# Patient Record
Sex: FEMALE | Race: WHITE | NOT HISPANIC OR LATINO | Employment: FULL TIME | URBAN - METROPOLITAN AREA
[De-identification: names, ages, dates, MRNs, and addresses within clinical notes are randomized per-mention and may not be internally consistent; named-entity substitution may affect disease eponyms.]

---

## 2020-03-31 ENCOUNTER — TELEPHONE (OUTPATIENT)
Dept: FAMILY MEDICINE CLINIC | Facility: CLINIC | Age: 25
End: 2020-03-31

## 2020-03-31 ENCOUNTER — TELEMEDICINE (OUTPATIENT)
Dept: FAMILY MEDICINE CLINIC | Facility: CLINIC | Age: 25
End: 2020-03-31
Payer: COMMERCIAL

## 2020-03-31 DIAGNOSIS — Z20.822 SUSPECTED COVID-19 VIRUS INFECTION: Primary | ICD-10-CM

## 2020-03-31 PROCEDURE — 99213 OFFICE O/P EST LOW 20 MIN: CPT | Performed by: FAMILY MEDICINE

## 2020-03-31 NOTE — ASSESSMENT & PLAN NOTE
Patient is asymptomatic   2 employees at [employer] tested [employer] 10 days ago  She doesn't know if there was any direct exposure  She denies all symptoms except for mild cough after exercising yesterday  She feels absolutely fine  Does not require testing at this time  Pt does not have a PCP  Will establish care with our office  See in 3 months for in office visit

## 2020-03-31 NOTE — PROGRESS NOTES
COVID-19 Virtual Visit     This virtual check-in was done via [employer] and patient was informed that this is not a secure, HIPAA-complaint platform  she agrees to proceed     Encounter provider Roselynn Angelucci, MD    Provider located at [address] [place], [place] [address]  [phone]    Recent Visits  No visits were found meeting these conditions  Showing recent visits within past 7 days and meeting all other requirements     Today's Visits  Date Type Provider Dept   03/31/20 Telemedicine Roselynn Angelucci, MD [facility]   03/31/20 Telephone Sosa Yuen, [facility] today's visits and meeting all other requirements     Future Appointments  Date Type Provider Dept   03/31/20 Telephone Sosa Yuen [address]   03/31/20 [address], MD [address] [place] [address]   Showing future appointments within next 150 days and meeting all other requirements        Patient agrees to participate in a virtual check in via telephone or video visit instead of presenting to the office to address urgent/immediate medical needs  Patient is aware this is a billable service  After connecting through [employer], the patient was identified by name and date of birth  Speedy Orosco was informed that this was a telemedicine visit and that the exam was being conducted confidentially over secure lines  My office door was closed  No one else was in the room  Speedy Shieldsbury acknowledged consent and understanding of privacy and security of the telemedicine visit  I informed the patient that I have reviewed her record in Epic and presented the opportunity for her to ask any questions regarding the visit today  The patient agreed to participate  Speedy Orosco is a 23 y o  female who is concerned about COVID-19  [employer] employee possible exposure to 2 covid cases    Doesn't know if there is any direct contact  Cough yesterday after working out which is now gone  Denies Fever, checked 2x a week at work  Denies SOB  Patient states she probably is just being paranoid she feels fine  She reports mild cough after exercise yesterday  She has not traveled outside the [place]  within the last 14 days    She has not had contact with a person who is under investigation for or who is positive for COVID-19 within the last 14 days  She has not been hospitalized recently for fever and/or lower respiratory symptoms  No past medical history on file  No past surgical history on file  No current outpatient medications on file  No current facility-administered medications for this visit  Allergies not on file    Video Exam    Celia Harmon appears well  No signs of distress  AOx3  Disposition:      After clarifying the patient's history, my suspicion for COVID-19 infection is very low  I spent 15 minutes with the patient during this virtual check-in visit

## 2020-03-31 NOTE — TELEPHONE ENCOUNTER
Do you have a fever of 100 5 and above?: denies  Do you have a cough?: yes  Do you have shortness of breath?: denies   Are you currently or have you been suffering from a 100 5 fever and diarrhea? denies    Have you visited [facility]?:denies    Have you visited [facility]    Have you visited [facility], [address]?:denies    Have you been exposed to someone who has been diagnosed with coronavirus?: [employer] employee    Do you commute daily to and from [place] or [place] for work?: denies    Patient is worried and would like a call back from a provider because she works an [employer] in [place] and 2 employees resulted positive but has no idea if she has been in contact with them       Patient can be reached at [phone]

## 2020-03-31 NOTE — TELEPHONE ENCOUNTER
Please confirm her registration info, confirm whether she has a method for video visit, and then Dr Vero Freedman can do a virtual on-the-fly encounter  Thanks!

## 2020-04-07 ENCOUNTER — TELEPHONE (OUTPATIENT)
Dept: FAMILY MEDICINE CLINIC | Facility: CLINIC | Age: 25
End: 2020-04-07

## 2020-07-14 ENCOUNTER — OFFICE VISIT (OUTPATIENT)
Dept: FAMILY MEDICINE CLINIC | Facility: CLINIC | Age: 25
End: 2020-07-14
Payer: COMMERCIAL

## 2020-07-14 VITALS
HEART RATE: 80 BPM | HEIGHT: 63 IN | SYSTOLIC BLOOD PRESSURE: 118 MMHG | BODY MASS INDEX: 27.07 KG/M2 | RESPIRATION RATE: 16 BRPM | DIASTOLIC BLOOD PRESSURE: 70 MMHG | TEMPERATURE: 98.6 F | OXYGEN SATURATION: 97 % | WEIGHT: 152.8 LBS

## 2020-07-14 DIAGNOSIS — Z13.29 SCREENING FOR THYROID DISORDER: ICD-10-CM

## 2020-07-14 DIAGNOSIS — Z11.4 SCREENING FOR HIV (HUMAN IMMUNODEFICIENCY VIRUS): ICD-10-CM

## 2020-07-14 DIAGNOSIS — Z13.1 SCREENING FOR DIABETES MELLITUS: ICD-10-CM

## 2020-07-14 DIAGNOSIS — Z13.220 SCREENING FOR LIPID DISORDERS: ICD-10-CM

## 2020-07-14 DIAGNOSIS — Z00.00 WELLNESS EXAMINATION: Primary | ICD-10-CM

## 2020-07-14 PROCEDURE — NC001 PR NO CHARGE: Performed by: FAMILY MEDICINE

## 2020-07-14 PROCEDURE — 3008F BODY MASS INDEX DOCD: CPT | Performed by: FAMILY MEDICINE

## 2020-07-14 PROCEDURE — 1036F TOBACCO NON-USER: CPT | Performed by: FAMILY MEDICINE

## 2020-07-14 PROCEDURE — 99395 PREV VISIT EST AGE 18-39: CPT | Performed by: FAMILY MEDICINE

## 2020-07-14 RX ORDER — NORETHINDRONE ACETATE AND ETHINYL ESTRADIOL, ETHINYL ESTRADIOL AND FERROUS FUMARATE 1MG-10(24)
KIT ORAL
COMMUNITY
Start: 2020-06-27

## 2020-07-14 NOTE — ASSESSMENT & PLAN NOTE
Patient has no significant past medical history  Patient likely screen for thyroid, diabetes and cholesterol  Patient instructed to download my chart  Will call patient with any abnormal labs  Will see patient back in 1 year  For physical or as needed  Patient takes birth control which is managed by her OBGYN      vaccinations up-to-date  Patient obtains flu shot from work

## 2020-07-14 NOTE — PROGRESS NOTES
Family Medicine Follow-Up Office Visit  Zachary Campbell 23 y o  female   MRN: [id number] : 1995  ENCOUNTER: 2020 1:35 PM    Assessment and Plan   Screening for diabetes mellitus  Family history of diabetes  Patient instructed to fast prior to obtaining labs    Screening for thyroid disorder   See  Wellness note    Screening for HIV (human immunodeficiency virus)   See wellness note    Wellness examination   Patient has no significant past medical history  Patient likely screen for thyroid, diabetes and cholesterol  Patient instructed to download my chart  Will call patient with any abnormal labs  Will see patient back in 1 year  For physical or as needed  Patient takes birth control which is managed by her OBGYN      vaccinations up-to-date  Patient obtains flu shot from work  Chief Complaint     Chief Complaint   Patient presents with    Establish Care       History of Present Illness   Zachary Campbell is a 23y o -year-old female who presents today   To establish care  Patient reports she has not seen a primary care physician since returning 26  She sees an OBGYN outside of the [facility]  Family history positive for heart disease in her father and brother but is unsure on specifics  Patient's mother passed away at 13 and she believes she suffered from depression  Maternal grandmother has diabetes  Patient has no significant medical history personally  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 [employer]  We initially had a telemedicine for covid screening  Review of Systems   Review of Systems   Constitutional: Negative for fatigue and fever  HENT: Negative for congestion and sore throat  Eyes: Negative for redness and visual disturbance  Respiratory: Negative for cough and shortness of breath  Cardiovascular: Negative for chest pain and palpitations  Gastrointestinal: Negative for abdominal pain and nausea     Genitourinary: Negative for difficulty urinating and dysuria  Musculoskeletal: Negative for arthralgias and back pain  Skin: Negative  Negative for color change and rash  Neurological: Negative for dizziness and headaches  Psychiatric/Behavioral: Negative  Active Problem List     Patient Active Problem List   Diagnosis    Suspected Covid-19 Virus Infection    Screening for diabetes mellitus    Screening for thyroid disorder    Screening for HIV (human immunodeficiency virus)    Wellness examination    Screening for lipid disorders       Past Medical History, Past Surgical History, Family History, and Social History were reviewed and updated today as appropriate  Objective   /70 (BP Location: Right arm, Patient Position: Sitting, Cuff Size: Adult)   Pulse 80   Temp 98 6 °F (37 °C) (Tympanic)   Resp 16   Ht 5' 3" (1 6 m)   Wt 69 3 kg (152 lb 12 8 oz)   SpO2 97%   BMI 27 07 kg/m²     Physical Exam   Constitutional: She is oriented to person, place, and time  She appears well-developed and well-nourished  HENT:   Head: Normocephalic and atraumatic  Eyes: Pupils are equal, round, and reactive to light  EOM are normal    Neck: Normal range of motion  Neck supple  Cardiovascular: Normal rate, regular rhythm and normal heart sounds  No murmur heard  Pulmonary/Chest: Effort normal and breath sounds normal    Abdominal: Soft  Bowel sounds are normal    Musculoskeletal: Normal range of motion  Neurological: She is alert and oriented to person, place, and time  Skin: Skin is warm and dry  Psychiatric: She has a normal mood and affect  Her behavior is normal    Nursing note and vitals reviewed        Pertinent Laboratory/Diagnostic Studies:  No results found for: GLUCOSE, BUN, CREATININE, CALCIUM, NA, K, CO2, CL  No results found for: ALT, AST, GGT, ALKPHOS, BILITOT    No results found for: WBC, HGB, HCT, MCV, PLT    No results found for: TSH    No results found for: CHOL  No results found for: TRIG  No results found for: HDL  No results found for: LDLCALC  No results found for: HGBA1C    No results found for this or any previous visit  Orders Placed This Encounter   Procedures    CBC and differential    Lipid panel    Comprehensive metabolic panel    TSH, 3rd generation with Free T4 reflex    HIV 1/2 Antigen/Antibody (4th Generation) w Reflex [facility]         Current Medications     Current Outpatient Medications   Medication Sig Dispense Refill    LO LOESTRIN FE 1 MG-10 MCG / 10 MCG TABS        No current facility-administered medications for this visit  ALLERGIES:  No Known Allergies    Health Maintenance     Health Maintenance   Topic Date Due    DTaP,Tdap,and Td Vaccines (1 - Tdap) 01/19/2008    HPV Vaccine (1 - Female 2-dose series) 01/19/2008    HIV Screening  01/19/2012    Chlamydia Screening  01/19/2013    BMI: Followup Plan  01/19/2015    Annual Physical  01/19/2015    Cervical Cancer Screening  01/19/2018    Influenza Vaccine  07/01/2020    Depression Screening PHQ  07/14/2021    BMI: Adult  07/14/2021    Pneumococcal Vaccine: 65+ Years (1 of 2 - PCV13) 01/19/2062    Pneumococcal Vaccine: Pediatrics (0 to 5 Years) and At-Risk Patients (6 to 59 Years)  Aged Out    HIB Vaccine  Aged Out    Hepatitis B Vaccine  Aged Out    IPV Vaccine  Aged Out    Hepatitis A Vaccine  Aged Out    Meningococcal ACWY Vaccine  Aged Out       There is no immunization history on file for this patient  Mao Bush MD   [address]  7/14/2020  1:35 PM    Parts of this note were dictated using [email or web address] dictation software and may have sounds-like errors due to variation in pronunciation

## 2020-08-26 ENCOUNTER — TELEPHONE (OUTPATIENT)
Dept: OTHER | Facility: OTHER | Age: 25
End: 2020-08-26

## 2020-08-26 NOTE — TELEPHONE ENCOUNTER
Charge corrected and emailed sent to billing office  Called patient to inform, no answer, left message requesting a call back to [phone]

## 2022-05-26 ENCOUNTER — TELEPHONE (OUTPATIENT)
Dept: FAMILY MEDICINE CLINIC | Facility: CLINIC | Age: 27
End: 2022-05-26